# Patient Record
Sex: FEMALE | Race: WHITE
[De-identification: names, ages, dates, MRNs, and addresses within clinical notes are randomized per-mention and may not be internally consistent; named-entity substitution may affect disease eponyms.]

---

## 2019-03-23 ENCOUNTER — HOSPITAL ENCOUNTER (EMERGENCY)
Dept: HOSPITAL 10 - FTE | Age: 34
Discharge: HOME | End: 2019-03-23
Payer: COMMERCIAL

## 2019-03-23 ENCOUNTER — HOSPITAL ENCOUNTER (EMERGENCY)
Dept: HOSPITAL 91 - FTE | Age: 34
Discharge: HOME | End: 2019-03-23
Payer: COMMERCIAL

## 2019-03-23 VITALS
WEIGHT: 278.66 LBS | BODY MASS INDEX: 44.78 KG/M2 | HEIGHT: 66 IN | BODY MASS INDEX: 44.78 KG/M2 | HEIGHT: 66 IN | WEIGHT: 278.66 LBS

## 2019-03-23 VITALS — DIASTOLIC BLOOD PRESSURE: 86 MMHG | HEART RATE: 74 BPM | RESPIRATION RATE: 17 BRPM | SYSTOLIC BLOOD PRESSURE: 161 MMHG

## 2019-03-23 DIAGNOSIS — K80.20: Primary | ICD-10-CM

## 2019-03-23 LAB
ADD MAN DIFF?: NO
ADD UMIC: YES
ALANINE AMINOTRANSFERASE: 24 IU/L (ref 13–69)
ALBUMIN/GLOBULIN RATIO: 1.16
ALBUMIN: 4.2 G/DL (ref 3.3–4.9)
ALKALINE PHOSPHATASE: 140 IU/L (ref 42–121)
ANION GAP: 9 (ref 5–13)
ASPARTATE AMINO TRANSFERASE: 25 IU/L (ref 15–46)
BASOPHIL #: 0.1 10^3/UL (ref 0–0.1)
BASOPHILS %: 0.6 % (ref 0–2)
BILIRUBIN,DIRECT: 0 MG/DL (ref 0–0.2)
BILIRUBIN,TOTAL: 0.6 MG/DL (ref 0.2–1.3)
BLOOD UREA NITROGEN: 10 MG/DL (ref 7–20)
CALCIUM: 9.3 MG/DL (ref 8.4–10.2)
CARBON DIOXIDE: 28 MMOL/L (ref 21–31)
CHLORIDE: 106 MMOL/L (ref 97–110)
CREATININE: 0.53 MG/DL (ref 0.44–1)
EOSINOPHILS #: 0.2 10^3/UL (ref 0–0.5)
EOSINOPHILS %: 1.8 % (ref 0–7)
GLOBULIN: 3.6 G/DL (ref 1.3–3.2)
GLUCOSE: 110 MG/DL (ref 70–220)
HEMATOCRIT: 40.8 % (ref 37–47)
HEMOGLOBIN: 13.9 G/DL (ref 12–16)
IMMATURE GRANS #M: 0.03 10^3/UL (ref 0–0.03)
IMMATURE GRANS % (M): 0.3 % (ref 0–0.43)
LIPASE: 85 U/L (ref 23–300)
LYMPHOCYTES #: 2.1 10^3/UL (ref 0.8–2.9)
LYMPHOCYTES %: 20.7 % (ref 15–51)
MEAN CORPUSCULAR HEMOGLOBIN: 30.4 PG (ref 29–33)
MEAN CORPUSCULAR HGB CONC: 34.1 G/DL (ref 32–37)
MEAN CORPUSCULAR VOLUME: 89.3 FL (ref 82–101)
MEAN PLATELET VOLUME: 10.1 FL (ref 7.4–10.4)
MONOCYTE #: 0.6 10^3/UL (ref 0.3–0.9)
MONOCYTES %: 5.8 % (ref 0–11)
NEUTROPHIL #: 7.1 10^3/UL (ref 1.6–7.5)
NEUTROPHILS %: 70.8 % (ref 39–77)
NUCLEATED RED BLOOD CELLS #: 0 10^3/UL (ref 0–0)
NUCLEATED RED BLOOD CELLS%: 0 /100WBC (ref 0–0)
PLATELET COUNT: 315 10^3/UL (ref 140–415)
POTASSIUM: 4.1 MMOL/L (ref 3.5–5.1)
RED BLOOD COUNT: 4.57 10^6/UL (ref 4.2–5.4)
RED CELL DISTRIBUTION WIDTH: 12.5 % (ref 11.5–14.5)
SODIUM: 143 MMOL/L (ref 135–144)
TOTAL PROTEIN: 7.8 G/DL (ref 6.1–8.1)
UR ASCORBIC ACID: NEGATIVE MG/DL
UR BILIRUBIN (DIP): NEGATIVE MG/DL
UR BLOOD (DIP): (no result) MG/DL
UR CLARITY: CLEAR
UR COLOR: YELLOW
UR GLUCOSE (DIP): NEGATIVE MG/DL
UR KETONES (DIP): NEGATIVE MG/DL
UR LEUKOCYTE ESTERASE (DIP): NEGATIVE LEU/UL
UR MUCUS: (no result) /HPF
UR NITRITE (DIP): NEGATIVE MG/DL
UR PH (DIP): 5 (ref 5–9)
UR RBC: 7 /HPF (ref 0–5)
UR SPECIFIC GRAVITY (DIP): 1.02 (ref 1–1.03)
UR TOTAL PROTEIN (DIP): NEGATIVE MG/DL
UR UROBILINOGEN (DIP): NEGATIVE MG/DL
UR WBC: 2 /HPF (ref 0–5)
WHITE BLOOD COUNT: 10 10^3/UL (ref 4.8–10.8)

## 2019-03-23 PROCEDURE — 80053 COMPREHEN METABOLIC PANEL: CPT

## 2019-03-23 PROCEDURE — 76705 ECHO EXAM OF ABDOMEN: CPT

## 2019-03-23 PROCEDURE — 83690 ASSAY OF LIPASE: CPT

## 2019-03-23 PROCEDURE — 96361 HYDRATE IV INFUSION ADD-ON: CPT

## 2019-03-23 PROCEDURE — 99285 EMERGENCY DEPT VISIT HI MDM: CPT

## 2019-03-23 PROCEDURE — 81001 URINALYSIS AUTO W/SCOPE: CPT

## 2019-03-23 PROCEDURE — 85025 COMPLETE CBC W/AUTO DIFF WBC: CPT

## 2019-03-23 PROCEDURE — 96375 TX/PRO/DX INJ NEW DRUG ADDON: CPT

## 2019-03-23 PROCEDURE — 84703 CHORIONIC GONADOTROPIN ASSAY: CPT

## 2019-03-23 PROCEDURE — 36415 COLL VENOUS BLD VENIPUNCTURE: CPT

## 2019-03-23 PROCEDURE — 96374 THER/PROPH/DIAG INJ IV PUSH: CPT

## 2019-03-23 RX ADMIN — FAMOTIDINE 1 MG: 10 INJECTION, SOLUTION INTRAVENOUS at 07:39

## 2019-03-23 RX ADMIN — MORPHINE SULFATE 1 MG: 2 INJECTION, SOLUTION INTRAMUSCULAR; INTRAVENOUS at 07:39

## 2019-03-23 RX ADMIN — THIAMINE HYDROCHLORIDE 1 MLS/HR: 100 INJECTION, SOLUTION INTRAMUSCULAR; INTRAVENOUS at 07:39

## 2019-03-23 RX ADMIN — ONDANSETRON HYDROCHLORIDE 1 MG: 2 INJECTION, SOLUTION INTRAMUSCULAR; INTRAVENOUS at 07:39

## 2019-03-23 NOTE — ERD
ER Documentation


Chief Complaint


Chief Complaint





upper abdominal pain radiating to back this morning, + vomiting





HPI


33-year-old female, with history of cholelithiasis the pain is dull,, presents 


the emergency department, complaining of acute onset of right upper quadrant 


abdominal pain, radiating to the back.  Constant, 4/10.  No fever, no chills.  


The patient also reports 3 episodes of postprandial emesis.





ROS


All systems reviewed and are negative except as per history of present illness.





Medications


Home Meds


Active Scripts


Acetaminophen* (Tylenol*) 325 Mg Tablet, 2 TAB PO Q8 PRN for PAIN AND OR 


ELEVATED TEMP, #20 TAB


   Prov:ISI BUTLER MD         3/23/19


Ranitidine Hcl* (Zantac*) 150 Mg Tablet, 150 MG PO BID PRN for EPIGASTRIC PAIN, 


#10 TAB


   Prov:ISI BUTLER MD         3/23/19


Acetaminophen* (Tylophen*) 500 Mg Capsule, 1 CAP PO Q6H PRN for PAIN AND OR 


ELEVATED TEMP, #20 CAP


   Prov:ISI BUTLER MD         18


Ranitidine Hcl* (Ranitidine Hcl*) 150 Mg Tablet, 150 MG PO Q12, #60 TAB


   Prov:ISI BUTLER MD         18


Ondansetron (Ondansetron Odt) 4 Mg Tab.rapdis, 4 MG PO Q6H PRN for NAUSEA AND/OR


VOMITING, #20 TAB


   Prov:RESHMA POLO PA-C         16


Hydrocodone/Acetaminophen (Norco 5-325 Tablet) 1 Each Tablet, 1 TAB PO Q6H PRN f


or PAIN, #20 TAB


   Prov:RESHMA POLO PA-C         16


Reported Medications


Folic Acid* (Folic Acid*) 1 Mg Tablet, 1 MG PO DAILY, TAB


   14


Multivit/Min/Fol Ac/Iron/Pren* (Prenatal S*) 1 Tab Tab, 1 TAB PO DAILY, TAB


   14


Hydrochlorothiazide* (Hydrochlorothiazide*) 25 Mg Tab, 25 MG PO DAILY, TAB


   14





Allergies


Allergies:  


Coded Allergies:  


     No Known Allergies (Verified  Allergy, Mild, 3/23/19)





PMhx/Soc


History of Surgery:  No


Anesthesia Reaction:  No


Hx Neurological Disorder:  No


Hx Respiratory Disorders:  No


Hx Cardiac Disorders:  No


Hx Psychiatric Problems:  No


Hx Miscellaneous Medical Probl:  No


Hx Alcohol Use:  No


Hx Substance Use:  No


Hx Tobacco Use:  No





FmHx


Family History:  diabetes; 


   No coronary disease





Physical Exam


Vitals





Vital Signs


  Date      Temp  Pulse  Resp  B/P (MAP)   Pulse Ox  O2          O2 Flow    FiO2


Time                                                 Delivery    Rate


   3/23/19  97.9     86    18      143/87        99


     06:43                          (105)





Physical Exam


Const:   No acute distress


Head:   Atraumatic 


Eyes:    Normal Conjunctiva


ENT:    Normal External Ears, Nose and Mouth.


Neck:               Full range of motion. No meningismus.


Resp:   Clear to auscultation bilaterally


Cardio:   Regular rate and rhythm, no murmurs


Abd:    Soft, tender in the right upper quadrant area,?  Blanchard sign.  Normal 


bowel sounds


Skin:   No petechiae or rashes


Back:   No midline or flank tenderness


Ext:    No cyanosis, or edema


Neur:   Awake and alert


Psych:    Normal Mood and Affect


Result Diagram:  


3/23/19 0729                                                                    


           3/23/19 0729





Results 24 hrs





Laboratory Tests


              Test
                                  3/23/19
07:29


              White Blood Count                      10.0 10^3/ul


              Red Blood Count                        4.57 10^6/ul


              Hemoglobin                                13.9 g/dl


              Hematocrit                                   40.8 %


              Mean Corpuscular Volume                     89.3 fl


              Mean Corpuscular Hemoglobin                 30.4 pg


              Mean Corpuscular Hemoglobin
Concent      34.1 g/dl 



              Red Cell Distribution Width                  12.5 %


              Platelet Count                          315 10^3/UL


              Mean Platelet Volume                        10.1 fl


              Immature Granulocytes %                     0.300 %


              Neutrophils %                                70.8 %


              Lymphocytes %                                20.7 %


              Monocytes %                                   5.8 %


              Eosinophils %                                 1.8 %


              Basophils %                                   0.6 %


              Nucleated Red Blood Cells %             0.0 /100WBC


              Immature Granulocytes #               0.030 10^3/ul


              Neutrophils #                           7.1 10^3/ul


              Lymphocytes #                           2.1 10^3/ul


              Monocytes #                             0.6 10^3/ul


              Eosinophils #                           0.2 10^3/ul


              Basophils #                             0.1 10^3/ul


              Nucleated Red Blood Cells #             0.0 10^3/ul


              Urine Color                          YELLOW


              Urine Clarity                        CLEAR


              Urine pH                                        5.0


              Urine Specific Gravity                        1.021


              Urine Ketones                        NEGATIVE mg/dL


              Urine Nitrite                        NEGATIVE mg/dL


              Urine Bilirubin                      NEGATIVE mg/dL


              Urine Urobilinogen                   NEGATIVE mg/dL


              Urine Leukocyte Esterase
            NEGATIVE
John/ul


              Urine Microscopic RBC                        7 /HPF


              Urine Microscopic WBC                        2 /HPF


              Urine Mucus                          FEW /HPF


              Urine Hemoglobin                           2+ mg/dL


              Urine Glucose                        NEGATIVE mg/dL


              Urine Total Protein                  NEGATIVE mg/dl


              Sodium Level                             143 mmol/L


              Potassium Level                          4.1 mmol/L


              Chloride Level                           106 mmol/L


              Carbon Dioxide Level                      28 mmol/L


              Anion Gap                                         9


              Blood Urea Nitrogen                        10 mg/dl


              Creatinine                               0.53 mg/dl


              Est Glomerular Filtrat Rate
mL/min   > 60 mL/min 



              Glucose Level                             110 mg/dl


              Calcium Level                             9.3 mg/dl


              Total Bilirubin                           0.6 mg/dl


              Direct Bilirubin                         0.00 mg/dl


              Indirect Bilirubin                        0.6 mg/dl


              Aspartate Amino Transf
(AST/SGOT)          25 IU/L 



              Alanine Aminotransferase
(ALT/SGPT)        24 IU/L 



              Alkaline Phosphatase                       140 IU/L


              Total Protein                              7.8 g/dl


              Albumin                                    4.2 g/dl


              Globulin                                  3.60 g/dl


              Albumin/Globulin Ratio                         1.16


              Lipase                                       85 U/L


              Serum HCG, Qualitative               NEGATIVE





Current Medications


 Medications
   Dose
          Sig/Cody
       Start Time
   Status  Last


 (Trade)       Ordered        Route
 PRN     Stop Time              Admin
Dose


                              Reason                                Admin


 Sodium         1,000 ml @ 
   Q1H STAT
      3/23/19       DC           3/23/19


Chloride       1,000 mls/hr   IV
            07:02
                       07:39



                                             3/23/19 08:01


 Morphine       1 mg           ONCE  STAT
    3/23/19       DC           3/23/19


Sulfate
                      IV
            07:02
                       07:39



(morphine)                                   3/23/19 07:07


 Ondansetron    4 mg           ONCE  STAT
    3/23/19       DC           3/23/19


HCl
  (Zofran                 IV
            07:02
                       07:39



Inj)                                         3/23/19 07:07


 Famotidine
    20 mg          ONCE  STAT
    3/23/19       DC           3/23/19


(Pepcid Iv)                   IV
            07:02
                       07:39



                                             3/23/19 07:07





Patient: STACIA DUNLAP   : 1985   Age: 33  Sex: F                    


   


MR #:    [id number]   Acct #:   [id number]    DOS: 19


Ordering MD: ISI BUTLER MD   Location:  [facility]   Room/Bed:            


                               





PROCEDURE:   ULTRASOUND LIMITED ABDOMEN  


 


CLINICAL INDICATION:   33-year-old female with abdominal pain. 


 


TECHNIQUE:   Multiple sonographic of the right upper quadrant of the abdomen 


were obtained. The images were reviewed on a PACS workstation. 


 


COMPARISON:   Right upper quadrant ultrasound 2018. 


 


FINDINGS:


 


The pancreas is partially visualized and is otherwise without abnormal 


echogenicity.


 


The liver displays diffuse increased echogenicity. The liver measures 18.0 cm in


length.  No evidence of intrahepatic biliary ductal dilatation is seen. The 


portal and hepatic veins are unremarkable. 


 


The gallbladder contains a shadowing stone measuring approximately 2.3 cm. The 


gallbladder wall thickness is within normal limits measuring 2.3 mm. No 


pericholecystic fluid is seen. The common bile duct measures 4.2 mm and is not 


dilated. 


 


The right kidney displays normal echogenicity. The right kidney measures 13.2 cm


in maximal length. No caliectasis or hydronephrosis is seen.


 


No free fluid is seen.


 


IMPRESSION:


 


1.  Hepatic steatosis.


2.  Cholelithiasis.





Procedures/MDM


Vital signs stable. Differential diagnosis include but not limited to: UTI, 


colitis, gastroenteritis, kidney stones, irritable bowel syndrome, inflammatory 


bowel syndrome, malabsorption syndrome, cholelithiasis, food intolerance, 


medication side effect, pancreatitis, diverticulitis, bowel obstruction.  


Physical examination and clinical presentation consistent most likely with 


biliary colic, no evidence of acute cholecystitis.


During the ED course the patient remained stable, no new complaints. The patient


received treatment with IV fluids and IV medications presenting overall improvem


ent of the symptoms.


Results and clinical impression discussed with the patient who agrees with colt hernandez. The patient is stable to be treated outpatient and will be discharged 


home; some side effects of prescribed medications (headache, rash, nausea, 


vomiting, diarrhea, drowsiness, habituation, bleeding, hypertension, 


interactions with other medications) were reviewed.





Follow up with the primary care provider in the next 48h is recommended.  If 


symptoms persist, worsen or new symptoms develop, then patient should return to 


the ED immediately.





Instructions explained and given directly by me to the patient with 


acknowledgment and demonstrated understanding.





Disclaimer: Inadvertent spelling and grammatical errors are likely due to 


EHR/dictation software use and do not reflect on the overall quality of patient 


care. Also, please note that the electronic time recorded on this note does not 


necessarily reflect the actual time of the patient encounter.





Departure


Diagnosis:  


   Primary Impression:  


   Biliary colic


Condition:  Stable





Additional Instructions:  


Thank you very much for allowing us to participate in your care. 


Your health and safety is our top priority at [facility].





Call your primary care doctor TOMORROW for an appointment during the next 2-4 


days and bring all the information and medications prescribed. 





Have prescriptions filled and follow precisely the directions on the label. 





If the symptoms get worse and your provider is unavailable, return to the 


Emergency Department immediately.











ISI BUTLER MD      Mar 23, 2019 07:02

## 2019-06-14 ENCOUNTER — HOSPITAL ENCOUNTER (EMERGENCY)
Dept: HOSPITAL 10 - FTE | Age: 34
LOS: 1 days | Discharge: HOME | End: 2019-06-15
Payer: COMMERCIAL

## 2019-06-14 ENCOUNTER — HOSPITAL ENCOUNTER (EMERGENCY)
Dept: HOSPITAL 91 - FTE | Age: 34
LOS: 1 days | Discharge: HOME | End: 2019-06-15
Payer: COMMERCIAL

## 2019-06-14 VITALS
BODY MASS INDEX: 45.53 KG/M2 | WEIGHT: 283.29 LBS | HEIGHT: 66 IN | HEIGHT: 66 IN | WEIGHT: 283.29 LBS | BODY MASS INDEX: 45.53 KG/M2

## 2019-06-14 VITALS — HEART RATE: 81 BPM | SYSTOLIC BLOOD PRESSURE: 158 MMHG | RESPIRATION RATE: 18 BRPM | DIASTOLIC BLOOD PRESSURE: 83 MMHG

## 2019-06-14 DIAGNOSIS — O03.9: Primary | ICD-10-CM

## 2019-06-14 DIAGNOSIS — Z3A.08: ICD-10-CM

## 2019-06-14 DIAGNOSIS — R10.2: ICD-10-CM

## 2019-06-14 PROCEDURE — 36415 COLL VENOUS BLD VENIPUNCTURE: CPT

## 2019-06-14 PROCEDURE — 81001 URINALYSIS AUTO W/SCOPE: CPT

## 2019-06-14 PROCEDURE — 84702 CHORIONIC GONADOTROPIN TEST: CPT

## 2019-06-14 PROCEDURE — 86900 BLOOD TYPING SEROLOGIC ABO: CPT

## 2019-06-14 PROCEDURE — 76801 OB US < 14 WKS SINGLE FETUS: CPT

## 2019-06-14 PROCEDURE — 85025 COMPLETE CBC W/AUTO DIFF WBC: CPT

## 2019-06-14 PROCEDURE — 86901 BLOOD TYPING SEROLOGIC RH(D): CPT

## 2019-06-14 PROCEDURE — 99284 EMERGENCY DEPT VISIT MOD MDM: CPT

## 2019-06-14 RX ADMIN — ACETAMINOPHEN 1 MG: 500 TABLET, FILM COATED ORAL at 23:58

## 2019-06-15 LAB
ADD MAN DIFF?: NO
ADD UMIC: YES
BASOPHIL #: 0.1 10^3/UL (ref 0–0.1)
BASOPHILS %: 0.5 % (ref 0–2)
EOSINOPHILS #: 0.2 10^3/UL (ref 0–0.5)
EOSINOPHILS %: 1.8 % (ref 0–7)
HEMATOCRIT: 38.1 % (ref 37–47)
HEMOGLOBIN: 12.9 G/DL (ref 12–16)
IMMATURE GRANS #M: 0.04 10^3/UL (ref 0–0.03)
IMMATURE GRANS % (M): 0.3 % (ref 0–0.43)
LYMPHOCYTES #: 2.9 10^3/UL (ref 0.8–2.9)
LYMPHOCYTES %: 24.1 % (ref 15–51)
MEAN CORPUSCULAR HEMOGLOBIN: 30.4 PG (ref 29–33)
MEAN CORPUSCULAR HGB CONC: 33.9 G/DL (ref 32–37)
MEAN CORPUSCULAR VOLUME: 89.6 FL (ref 82–101)
MEAN PLATELET VOLUME: 10.7 FL (ref 7.4–10.4)
MONOCYTE #: 0.7 10^3/UL (ref 0.3–0.9)
MONOCYTES %: 6.1 % (ref 0–11)
NEUTROPHIL #: 8.2 10^3/UL (ref 1.6–7.5)
NEUTROPHILS %: 67.2 % (ref 39–77)
NUCLEATED RED BLOOD CELLS #: 0 10^3/UL (ref 0–0)
NUCLEATED RED BLOOD CELLS%: 0 /100WBC (ref 0–0)
PLATELET COUNT: 301 10^3/UL (ref 140–415)
RED BLOOD COUNT: 4.25 10^6/UL (ref 4.2–5.4)
RED CELL DISTRIBUTION WIDTH: 13 % (ref 11.5–14.5)
UR ASCORBIC ACID: NEGATIVE MG/DL
UR BACTERIA: (no result) /HPF
UR BILIRUBIN (DIP): NEGATIVE MG/DL
UR BLOOD (DIP): (no result) MG/DL
UR CLARITY: (no result)
UR COLOR: YELLOW
UR GLUCOSE (DIP): NEGATIVE MG/DL
UR KETONES (DIP): NEGATIVE MG/DL
UR LEUKOCYTE ESTERASE (DIP): NEGATIVE LEU/UL
UR NITRITE (DIP): NEGATIVE MG/DL
UR PH (DIP): 6 (ref 5–9)
UR RBC: 2 /HPF (ref 0–5)
UR SPECIFIC GRAVITY (DIP): 1.02 (ref 1–1.03)
UR TOTAL PROTEIN (DIP): NEGATIVE MG/DL
UR UROBILINOGEN (DIP): (no result) MG/DL
UR WBC: 0 /HPF (ref 0–5)
WHITE BLOOD COUNT: 12.2 10^3/UL (ref 4.8–10.8)

## 2019-06-15 NOTE — ERD
ER Documentation


Chief Complaint


Chief Complaint





vaginal bleeding/cramping x 1 hour, states 8 weeks pregnant





HPI


History of Present Illness: 33-year-old female who denies a past medical history


coming in today with complaint of vaginal bleeding and lower abdominal cramping 


that is been present for 1 hour prior to arrival.  Patient is G4, P2.  Reports 


she is approximately 8 weeks pregnant.  Patient reports that bleeding is consist


ent with a menstrual cycle with very small clotting.





At home pharmacological/nonpharmacological treatment for symptoms: Denies





Denies social concerns; Denies recent foreign travel





ROS


All systems reviewed and are negative except as per history of present illness.





Medications


Home Meds


Active Scripts


Acetaminophen* (Tylenol*) 325 Mg Tablet, 2 TAB PO Q8 PRN for PAIN AND OR 


ELEVATED TEMP, #20 TAB


   Prov:ISI BUTLER MD         3/23/19


Ranitidine Hcl* (Zantac*) 150 Mg Tablet, 150 MG PO BID PRN for EPIGASTRIC PAIN, 


#10 TAB


   Prov:ISI BUTLER MD         3/23/19


Acetaminophen* (Tylophen*) 500 Mg Capsule, 1 CAP PO Q6H PRN for PAIN AND OR 


ELEVATED TEMP, #20 CAP


   Prov:ISI BUTLER MD         18


Ranitidine Hcl* (Ranitidine Hcl*) 150 Mg Tablet, 150 MG PO Q12, #60 TAB


   Prov:ISI BUTLER MD         18


Ondansetron (Ondansetron Odt) 4 Mg Tab.rapdis, 4 MG PO Q6H PRN for NAUSEA AND/OR


VOMITING, #20 TAB


   Prov:RESHMA POLO PA-C         16


Hydrocodone/Acetaminophen (Norco 5-325 Tablet) 1 Each Tablet, 1 TAB PO Q6H PRN 


for PAIN, #20 TAB


   Prov:RESHMA POLO PA-C         16


Reported Medications


Folic Acid* (Folic Acid*) 1 Mg Tablet, 1 MG PO DAILY, TAB


   14


Multivit/Min/Fol Ac/Iron/Pren* (Prenatal S*) 1 Tab Tab, 1 TAB PO DAILY, TAB


   14


Hydrochlorothiazide* (Hydrochlorothiazide*) 25 Mg Tab, 25 MG PO DAILY, TAB


   14





Allergies


Allergies:  


Coded Allergies:  


     No Known Allergies (Verified  Allergy, Mild, 19)





PMhx/Soc


History of Surgery:  Yes (c section x 2)


Anesthesia Reaction:  No


Hx Neurological Disorder:  No


Hx Respiratory Disorders:  No


Hx Cardiac Disorders:  No


Hx Psychiatric Problems:  No


Hx Miscellaneous Medical Probl:  No


Hx Alcohol Use:  No


Hx Substance Use:  No


Hx Tobacco Use:  No


Smoking Status:  Never smoker





FmHx


Family History:  diabetes, coronary disease





Physical Exam


Vitals





Vital Signs


  Date      Temp  Pulse  Resp  B/P (MAP)   Pulse Ox  O2          O2 Flow    FiO2


Time                                                 Delivery    Rate


   19  99.3     81    18      158/83       100


     22:01                          (108)





Physical Exam


Const:   No acute distress, afebrile


Head:   Atraumatic 


Eyes:    Normal Conjunctiva


ENT:    Normal External Ears, Nose and Mouth.


Neck:               Full range of motion. No meningismus.


Resp:   Clear to auscultation bilaterally


Cardio:   Regular rate and rhythm, no murmurs


Abd:    Soft, non tender, non distended.  No guarding, no masses, no rigidity.  


Obese.


Skin:   No petechiae or rashes


Back:   No midline or flank tenderness


Ext:    No cyanosis, or edema


Neur:   Awake and alert x3, speaking in clear sentences, no focal deficits or 


facial asymmetry


Psych:    Normal Mood and Affect


Result Diagram:  


19 2351





Results 24 hrs





Laboratory Tests


              Test
                                  19
23:51


              White Blood Count                      12.2 10^3/ul


              Red Blood Count                        4.25 10^6/ul


              Hemoglobin                                12.9 g/dl


              Hematocrit                                   38.1 %


              Mean Corpuscular Volume                     89.6 fl


              Mean Corpuscular Hemoglobin                 30.4 pg


              Mean Corpuscular Hemoglobin
Concent      33.9 g/dl 



              Red Cell Distribution Width                  13.0 %


              Platelet Count                          301 10^3/UL


              Mean Platelet Volume                        10.7 fl


              Immature Granulocytes %                     0.300 %


              Neutrophils %                                67.2 %


              Lymphocytes %                                24.1 %


              Monocytes %                                   6.1 %


              Eosinophils %                                 1.8 %


              Basophils %                                   0.5 %


              Nucleated Red Blood Cells %             0.0 /100WBC


              Immature Granulocytes #               0.040 10^3/ul


              Neutrophils #                           8.2 10^3/ul


              Lymphocytes #                           2.9 10^3/ul


              Monocytes #                             0.7 10^3/ul


              Eosinophils #                           0.2 10^3/ul


              Basophils #                             0.1 10^3/ul


              Nucleated Red Blood Cells #             0.0 10^3/ul


              Urine Color                          YELLOW


              Urine Clarity
                       SLIGHTLY
CLOUDY


              Urine pH                                        6.0


              Urine Specific Gravity                        1.016


              Urine Ketones                        NEGATIVE mg/dL


              Urine Nitrite                        NEGATIVE mg/dL


              Urine Bilirubin                      NEGATIVE mg/dL


              Urine Urobilinogen                         1+ mg/dL


              Urine Leukocyte Esterase
            NEGATIVE
John/ul


              Urine Microscopic RBC                        2 /HPF


              Urine Microscopic WBC                        0 /HPF


              Urine Bacteria                       FEW /HPF


              Urine Hemoglobin                           2+ mg/dL


              Urine Glucose                        NEGATIVE mg/dL


              Urine Total Protein                  NEGATIVE mg/dl


              Beta HCG, Quantitative
              408153.0
mIU/ml





Current Medications


 Medications
   Dose
          Sig/Cody
       Start Time
   Status  Last


 (Trade)       Ordered        Route
 PRN     Stop Time              Admin
Dose


                              Reason                                Admin


                1,000 mg       ONCE  STAT
    19       DC           19


Acetaminophen                 PO
            23:44
                       23:58




  (Tylenol                                  19 23:46


Tab)








Procedures/MDM


ED course includes a thorough examination and history. 


Medications Tylenol 


imaging: OB pelvic ultrasound


Labs: CBC, beta quantitative, urinalysis, Rh





Low suspicion for life-threatening medical emergency.  Low suspicion for gy


necologic/obstetrical emergency that requires hospitalization or immediate 


surgical intervention.  Low suspicion for hemorrhage.  Low suspicion for acute 


abdominal emergency.  Low suspicion for infectious process that requires 


antibiotics.





Otherwise healthy patient presenting with constellation of symptoms likely 


representing vaginal bleeding during pregnancy/threatened  as 


characterized by history, physical exam findings, lab findings, ultrasound 


findings.


CBC:      no e/o of systemic infection or severe anemia.  Beta quantitative is 


102k.  Urinalysis negative for urinary tract infection.  Rh is O+, no RhoGam 


indicated





Patient reassessment 0205: No signs of hemorrhage at this time, bleeding has not


worsened.  Patient hemodynamically stable.  No respiratory distress, otherwise 


relatively well appearing and nontoxic.  Disposition given.  Patient educated on


diagnoses, prescriptions, follow-up care, return precautions.  Strict return 


precautions given for worsening condition; questions answered discharge.  


Patient verbalizes understanding of discharge instructions as well as plan of 


care, return precautions, follow-up.





Disposition for discharge with followup in 2 days with PCP/clinic.





Departure


Diagnosis:  


   Primary Impression:  


   Threatened miscarriage


   Additional Impression:  


   Vaginal bleeding in pregnant patient at less than 20 weeks ges...


Condition:  Stable


Patient Instructions:  Bleeding During Early Pregnancy, Possible Miscarriage 


(Threatened )


Referrals:  


COMMUNITY CLINICS


YOU HAVE RECEIVED A MEDICAL SCREENING EXAM AND THE RESULTS INDICATE THAT YOU DO 


NOT HAVE A CONDITION THAT REQUIRES URGENT TREATMENT IN THE EMERGENCY DEPARTMENT.





FURTHER EVALUATION AND TREATMENT OF YOUR CONDITION CAN WAIT UNTIL YOU ARE SEEN 


IN YOUR DOCTORS OFFICE WITHIN THE NEXT 1-2 DAYS. IT IS YOUR RESPONSIBILITY TO 


MAKE AN APPOINTMENT FOR FOLOW-UP CARE.





IF YOU HAVE A PRIMARY DOCTOR


--you should call your primary doctor and schedule an appointment





IF YOU DO NOT HAVE A PRIMARY DOCTOR YOU CAN CALL OUR PHYSICIAN REFERRAL HOTLINE 


AT


 [phone] 





IF YOU CAN NOT AFFORD TO SEE A PHYSICIAN YOU CAN CHOSE FROM THE FOLLOWING 


[facility] [phone] [address] [facility]. [facility] [phone] [address] [facility]. [facility] [phone] [address] [facility] [phone] [address] [facility]. [facility] [phone] [address] [facility]. [facility] [phone]


[address] [facility]. [facility]


YOU HAVE RECEIVED A MEDICAL SCREENING EXAM AND THE RESULTS INDICATE THAT YOU DO 


NOT HAVE A CONDITION THAT REQUIRES URGENT TREATMENT IN THE EMERGENCY DEPARTMENT.





FURTHER EVALUATION AND TREATMENT OF YOUR CONDITION CAN WAIT UNTIL YOU ARE SEEN 


IN YOUR DOCTORS OFFICE WITHIN THE NEXT 1-2 DAYS. IT IS YOUR RESPONSIBILITY TO 


MAKE AN APPOINTMENT FOR FOLOW-UP CARE.





IF YOU HAVE A PRIMARY DOCTOR


--you should call your primary doctor and schedule and appointment





IF YOU DO NOT HAVE A PRIMARY DOCTOR YOU CAN CALL OUR PHYSICIAN REFERRAL HOTLINE 


AT [phone].





IF YOU CAN NOT AFFORD TO SEE A PHYSICIAN YOU CAN CHOSE FROM THE FOLLOWING [facility]:





[facility]


[address] [place] [address]





[facility]


[address] [place] [address]





[facility]


[address] [place] [address]





OB/GYN REFERRAL LIST


CHECO KINGSLEY MD


[address] [facility] [address] 


[address] [place] [address] [phone] OFFICE FAX [phone]





TIM ZIEGLER


[address] [place] [address] [phone]





DR. ARORA TOMI


[address] [facility], [place] [address]


[phone]





[facility], [facility]


[address] [facility], [address], [place] [address]


[phone]





KARLI SCHWARTZRO


[address] [facility], [place] [address]


[phone]





[facility]


[address] [facility] [place] [address]


[phone] [address] [facility], [facility] [address]


[phone]  [phone]





DR WARREN CAROLINE


[address], [place] [address]


[phone]





DR CABEZAS, SHAHBAZ


[address] [facility], [place] [address]


[phone]





DR ABEL, [facility]


[address] [place] [address]


[phone]





Additional Instructions:  


Thank you very much for allowing us to participate in your care. 


Your health and safety is our top priority at [facility].  It 


is important to read all discharge instructions and education provided in your 


discharge packet.





*There is a heartbeat on the ultrasound at this time.  Is very important to 


return to the emergency department if you develop worsening bleeding.  Signs of 


life-threatening hemorrhage include soaking 1 pad per hour.*





Call your primary care doctor/OB/GYN TOMORROW for an appointment during the next


2-4 days and bring all the information.





If the symptoms get worse and your provider is unavailable, return to the 


Emergency Department immediately.











LETY LEBRON NP            [place] 15, 2019 02:18

## 2019-07-18 ENCOUNTER — HOSPITAL ENCOUNTER (EMERGENCY)
Dept: HOSPITAL 10 - FTE | Age: 34
Discharge: HOME | End: 2019-07-18
Payer: COMMERCIAL

## 2019-07-18 ENCOUNTER — HOSPITAL ENCOUNTER (EMERGENCY)
Dept: HOSPITAL 91 - FTE | Age: 34
Discharge: HOME | End: 2019-07-18
Payer: COMMERCIAL

## 2019-07-18 VITALS
BODY MASS INDEX: 44.68 KG/M2 | HEIGHT: 66 IN | WEIGHT: 278 LBS | HEIGHT: 66 IN | WEIGHT: 278 LBS | BODY MASS INDEX: 44.68 KG/M2

## 2019-07-18 VITALS — RESPIRATION RATE: 18 BRPM | DIASTOLIC BLOOD PRESSURE: 88 MMHG | HEART RATE: 72 BPM | SYSTOLIC BLOOD PRESSURE: 138 MMHG

## 2019-07-18 DIAGNOSIS — O20.9: Primary | ICD-10-CM

## 2019-07-18 DIAGNOSIS — Z3A.14: ICD-10-CM

## 2019-07-18 DIAGNOSIS — R10.2: ICD-10-CM

## 2019-07-18 LAB
ADD MAN DIFF?: NO
ADD UMIC: YES
ALANINE AMINOTRANSFERASE: 19 IU/L (ref 13–69)
ALBUMIN/GLOBULIN RATIO: 1.08
ALBUMIN: 3.8 G/DL (ref 3.3–4.9)
ALKALINE PHOSPHATASE: 82 IU/L (ref 42–121)
ANION GAP: 7 (ref 5–13)
ASPARTATE AMINO TRANSFERASE: 15 IU/L (ref 15–46)
BASOPHIL #: 0 10^3/UL (ref 0–0.1)
BASOPHILS %: 0.3 % (ref 0–2)
BILIRUBIN,DIRECT: 0 MG/DL (ref 0–0.2)
BILIRUBIN,TOTAL: 0.5 MG/DL (ref 0.2–1.3)
BLOOD UREA NITROGEN: 9 MG/DL (ref 7–20)
CALCIUM: 9.6 MG/DL (ref 8.4–10.2)
CARBON DIOXIDE: 25 MMOL/L (ref 21–31)
CHLORIDE: 107 MMOL/L (ref 97–110)
CREATININE: 0.51 MG/DL (ref 0.44–1)
EOSINOPHILS #: 0.2 10^3/UL (ref 0–0.5)
EOSINOPHILS %: 1.4 % (ref 0–7)
GLOBULIN: 3.5 G/DL (ref 1.3–3.2)
GLUCOSE: 125 MG/DL (ref 70–220)
HEMATOCRIT: 38.3 % (ref 37–47)
HEMOGLOBIN: 12.9 G/DL (ref 12–16)
IMMATURE GRANS #M: 0.04 10^3/UL (ref 0–0.03)
IMMATURE GRANS % (M): 0.4 % (ref 0–0.43)
LYMPHOCYTES #: 2.1 10^3/UL (ref 0.8–2.9)
LYMPHOCYTES %: 19.8 % (ref 15–51)
MEAN CORPUSCULAR HEMOGLOBIN: 30.3 PG (ref 29–33)
MEAN CORPUSCULAR HGB CONC: 33.7 G/DL (ref 32–37)
MEAN CORPUSCULAR VOLUME: 89.9 FL (ref 82–101)
MEAN PLATELET VOLUME: 10.5 FL (ref 7.4–10.4)
MONOCYTE #: 0.6 10^3/UL (ref 0.3–0.9)
MONOCYTES %: 5.4 % (ref 0–11)
NEUTROPHIL #: 7.8 10^3/UL (ref 1.6–7.5)
NEUTROPHILS %: 72.7 % (ref 39–77)
NUCLEATED RED BLOOD CELLS #: 0 10^3/UL (ref 0–0)
NUCLEATED RED BLOOD CELLS%: 0 /100WBC (ref 0–0)
PLATELET COUNT: 245 10^3/UL (ref 140–415)
POTASSIUM: 4.5 MMOL/L (ref 3.5–5.1)
RED BLOOD COUNT: 4.26 10^6/UL (ref 4.2–5.4)
RED CELL DISTRIBUTION WIDTH: 13.1 % (ref 11.5–14.5)
SODIUM: 139 MMOL/L (ref 135–144)
TOTAL PROTEIN: 7.3 G/DL (ref 6.1–8.1)
UR ASCORBIC ACID: NEGATIVE MG/DL
UR BILIRUBIN (DIP): NEGATIVE MG/DL
UR BLOOD (DIP): (no result) MG/DL
UR CLARITY: CLEAR
UR COLOR: YELLOW
UR GLUCOSE (DIP): NEGATIVE MG/DL
UR KETONES (DIP): NEGATIVE MG/DL
UR LEUKOCYTE ESTERASE (DIP): NEGATIVE LEU/UL
UR MUCUS: (no result) /HPF
UR NITRITE (DIP): NEGATIVE MG/DL
UR PH (DIP): 6 (ref 5–9)
UR RBC: 46 /HPF (ref 0–5)
UR SPECIFIC GRAVITY (DIP): 1.01 (ref 1–1.03)
UR TOTAL PROTEIN (DIP): NEGATIVE MG/DL
UR UROBILINOGEN (DIP): NEGATIVE MG/DL
UR WBC: 2 /HPF (ref 0–5)
WHITE BLOOD COUNT: 10.7 10^3/UL (ref 4.8–10.8)

## 2019-07-18 PROCEDURE — 86901 BLOOD TYPING SEROLOGIC RH(D): CPT

## 2019-07-18 PROCEDURE — 76805 OB US >/= 14 WKS SNGL FETUS: CPT

## 2019-07-18 PROCEDURE — 99284 EMERGENCY DEPT VISIT MOD MDM: CPT

## 2019-07-18 PROCEDURE — 36415 COLL VENOUS BLD VENIPUNCTURE: CPT

## 2019-07-18 PROCEDURE — 86900 BLOOD TYPING SEROLOGIC ABO: CPT

## 2019-07-18 PROCEDURE — 81001 URINALYSIS AUTO W/SCOPE: CPT

## 2019-07-18 PROCEDURE — 80053 COMPREHEN METABOLIC PANEL: CPT

## 2019-07-18 PROCEDURE — 84702 CHORIONIC GONADOTROPIN TEST: CPT

## 2019-07-18 PROCEDURE — 85025 COMPLETE CBC W/AUTO DIFF WBC: CPT

## 2019-07-18 RX ADMIN — ACETAMINOPHEN 1 MG: 325 TABLET, FILM COATED ORAL at 04:10

## 2019-07-18 NOTE — ERD
ER Documentation


Chief Complaint


Chief Complaint





vaginal bleeding x 30 minutes, states 13 weeks pregnant





HPI


33-year-old female who is reportedly 13 weeks pregnant, G4, , last 


menstrual cycle 4/15/2019 presents with complaints of sudden onset heavy vaginal


bleeding with clotting just prior to arrival.  She also reports mild vaginal 


discomfort.  She took no medication for relief of symptoms.  She did have a 


normal ultrasound during this pregnancy on 7/10/2019.  This ultrasound did show 


positive fetal heart tones.  Patient denies any other symptoms at this time.





ROS


All systems reviewed and are negative except as per history of present illness.





Medications


Home Meds


Active Scripts


Acetaminophen* (Tylenol*) 325 Mg Tablet, 2 TAB PO Q8 PRN for PAIN AND OR 


ELEVATED TEMP, #20 TAB


   Prov:ISI BUTLER MD         3/23/19


Ranitidine Hcl* (Zantac*) 150 Mg Tablet, 150 MG PO BID PRN for EPIGASTRIC PAIN, 


#10 TAB


   Prov:ISI BUTLER MD         3/23/19


Acetaminophen* (Tylophen*) 500 Mg Capsule, 1 CAP PO Q6H PRN for PAIN AND OR 


ELEVATED TEMP, #20 CAP


   Prov:ISI BUTLER MD         18


Ranitidine Hcl* (Ranitidine Hcl*) 150 Mg Tablet, 150 MG PO Q12, #60 TAB


   Prov:ISI BUTLER MD         18


Ondansetron (Ondansetron Odt) 4 Mg Tab.rapdis, 4 MG PO Q6H PRN for NAUSEA AND/OR


VOMITING, #20 TAB


   Prov:RESHMA POLO PA-C         16


Hydrocodone/Acetaminophen (Norco 5-325 Tablet) 1 Each Tablet, 1 TAB PO Q6H PRN 


for PAIN, #20 TAB


   Prov:RESHMA POOL PA-C         16


Reported Medications


Folic Acid* (Folic Acid*) 1 Mg Tablet, 1 MG PO DAILY, TAB


   14


Multivit/Min/Fol Ac/Iron/Pren* (Prenatal S*) 1 Tab Tab, 1 TAB PO DAILY, TAB


   14


Hydrochlorothiazide* (Hydrochlorothiazide*) 25 Mg Tab, 25 MG PO DAILY, TAB


   14





Allergies


Allergies:  


Coded Allergies:  


     No Known Allergies (Verified  Allergy, Mild, 19)





PMhx/Soc


History of Surgery:  Yes (c section x 2)


Anesthesia Reaction:  No


Hx Neurological Disorder:  No


Hx Respiratory Disorders:  No


Hx Cardiac Disorders:  No


Hx Psychiatric Problems:  No


Hx Miscellaneous Medical Probl:  No


Hx Alcohol Use:  No


Hx Substance Use:  No


Hx Tobacco Use:  No





FmHx


Family History:  No diabetes





Physical Exam


Vitals





Vital Signs


  Date      Temp  Pulse  Resp  B/P (MAP)   Pulse Ox  O2          O2 Flow    FiO2


Time                                                 Delivery    Rate


   19  98.4     87    18      154/83        97


     00:28                          (106)





Physical Exam


Const:   No acute distress


Head:   Atraumatic 


Eyes:    Normal Conjunctiva


ENT:    Normal External Ears, Nose and Mouth.


Neck:               Full range of motion. No meningismus.


Resp:   Clear to auscultation bilaterally


Cardio:   Regular rate and rhythm, no murmurs


Abd:    Soft, non tender, non distended. Normal bowel sounds.  No rebound 


tenderness or guarding.  No McBurney's point tenderness.


Skin:   No petechiae or rashes


Back:   No midline or flank tenderness


Ext:    No cyanosis, or edema


Neur:   Awake and alert


Psych:    Normal Mood and Affect


Result Diagram:  


19 0129                                                                    


           19 0129





Results 24 hrs





Laboratory Tests


     Test
                                  19
01:25    19
01:29


     Urine Color                          YELLOW


     Urine Clarity                        CLEAR


     Urine pH                                        6.0


     Urine Specific Gravity                        1.010


     Urine Ketones                        NEGATIVE mg/dL


     Urine Nitrite                        NEGATIVE mg/dL


     Urine Bilirubin                      NEGATIVE mg/dL


     Urine Urobilinogen                   NEGATIVE mg/dL


     Urine Leukocyte Esterase
            NEGATIVE
John/ul  



     Urine Microscopic RBC                       46 /HPF


     Urine Microscopic WBC                        2 /HPF


     Urine Mucus                          FEW /HPF


     Urine Hemoglobin                           3+ mg/dL


     Urine Glucose                        NEGATIVE mg/dL


     Urine Total Protein                  NEGATIVE mg/dl


     White Blood Count                                       10.7 10^3/ul


     Red Blood Count                                         4.26 10^6/ul


     Hemoglobin                                                 12.9 g/dl


     Hematocrit                                                    38.3 %


     Mean Corpuscular Volume                                      89.9 fl


     Mean Corpuscular Hemoglobin                                  30.3 pg


     Mean Corpuscular Hemoglobin
Concent  
                    33.7 g/dl 



     Red Cell Distribution Width                                   13.1 %


     Platelet Count                                           245 10^3/UL


     Mean Platelet Volume                                         10.5 fl


     Immature Granulocytes %                                      0.400 %


     Neutrophils %                                                 72.7 %


     Lymphocytes %                                                 19.8 %


     Monocytes %                                                    5.4 %


     Eosinophils %                                                  1.4 %


     Basophils %                                                    0.3 %


     Nucleated Red Blood Cells %                              0.0 /100WBC


     Immature Granulocytes #                                0.040 10^3/ul


     Neutrophils #                                            7.8 10^3/ul


     Lymphocytes #                                            2.1 10^3/ul


     Monocytes #                                              0.6 10^3/ul


     Eosinophils #                                            0.2 10^3/ul


     Basophils #                                              0.0 10^3/ul


     Nucleated Red Blood Cells #                              0.0 10^3/ul


     Sodium Level                                              139 mmol/L


     Potassium Level                                           4.5 mmol/L


     Chloride Level                                            107 mmol/L


     Carbon Dioxide Level                                       25 mmol/L


     Anion Gap                                                          7


     Blood Urea Nitrogen                                          9 mg/dl


     Creatinine                                                0.51 mg/dl


     Est Glomerular Filtrat Rate
mL/min   
                > 60 mL/min 



     Glucose Level                                              125 mg/dl


     Calcium Level                                              9.6 mg/dl


     Total Bilirubin                                            0.5 mg/dl


     Direct Bilirubin                                          0.00 mg/dl


     Indirect Bilirubin                                         0.5 mg/dl


     Aspartate Amino Transf
(AST/SGOT)    
                      15 IU/L 



     Alanine Aminotransferase
(ALT/SGPT)  
                      19 IU/L 



     Alkaline Phosphatase                                         82 IU/L


     Total Protein                                               7.3 g/dl


     Albumin                                                     3.8 g/dl


     Globulin                                                   3.50 g/dl


     Albumin/Globulin Ratio                                          1.08


     Beta HCG, Quantitative                                71871.0 mIU/ml





Current Medications


 Medications
   Dose
          Sig/Cody
       Start Time
   Status  Last


 (Trade)       Ordered        Route
 PRN     Stop Time              Admin
Dose


                              Reason                                Admin


                650 mg         ONCE  ONCE
    19       DC           19


Acetaminophen                 PO
            04:30
                       04:10




  (Tylenol                                  19 04:31


Tab)


[place] [address]


                        Radiology Main Line: [phone]





                            DIAGNOSTIC IMAGING REPORT





Patient: STACIA DUNLAP   : 1985   Age: 33  Sex: F                    


   


       MR #:    [id number]   Acct #:   [id number]    DOS: 19 0000


Ordering MD: ARIANNA QUINN PA-C   Location:  [facility]   Room/Bed:             


                              


                                        


PROCEDURE:   US OB. 


 


CLINICAL INDICATION:   Uncertain size and dates. Vaginal bleeding. 


 


TECHNIQUE:   Multiple sonographic images of the pregnant uterus were obtained.  


The images were reviewed on a PACS workstation. 


 


COMPARISON:   No prior studies are available for comparison. 


 


FINDINGS:


There is a single live intrauterine gestation.  Fetal heart rate is 174 beats 


per minute. 


Measurements were made in order to determine fetal age. The results are as 


follows:


BPD = 2.42 cm.


HC = 8.82 cm.


AC = 8.27 cm.


FL = 1.53 cm.


Estimated fetal weight is 99 +/- 15 grams.  


LMP growth percentile is 96 %.    Maximum vertical pocket of amniotic fluid is 


3.7 cm.


Menstrual age by ultrasound dates is 14 weeks 2 days.  


The estimated date of delivery is 2020.  


Position is cephalic and placenta is anterior grade 0. There is no evidence for 


an abruption or placenta previa.


 


IMPRESSION:


1.  Single live intrauterine gestation of 14 weeks 2 days gestational age by 


ultrasound dates.  


2.  The estimated date of delivery is 2020. 


 


RPTAT: QQ


_____________________________________________ 


.Garry Chand MD, MD           Date    Time 


Electronically viewed and signed by .Garry Chand MD, MD on 2019 02:14 


 


D:  2019 02:14  T:  2019 02:14


.R/





CC: ARIANNA QUINN PA-C





[phone]


.





                          [place] [address]


                        Radiology Main Line: [phone]





                            DIAGNOSTIC IMAGING REPORT





Patient: STACIA DUNLAP   : 1985   Age: 33  Sex: F                    


   


       MR #:    [id number]   Acct #:   [id number]    DOS: 19 0000


Ordering MD: ARIANNA QUINN PA-C   Location:  [facility]   Room/Bed:             


                              


                                        


PROCEDURE:   US OB limited


 


CLINICAL INDICATION:    vaginal bleeding 


 


TECHNIQUE:   Limited transabdominal scan


 


COMPARISON:   Study from earlier 


 


FINDINGS:


 


An extremely limited study was performed with only 2 actual images submitted for


review. 1 image shows M-mode ultrasound over the fetus with heart rate of 142 


beats per minute. The other imaged shows an anterior placenta and on identified 


portions of the fetus.


 


IMPRESSION:


 


Extremely limited exam showing cardiac activity and anterior placenta.


 


 


 RPTAT: HLBE


_____________________________________________ 


Physician Matteo           Date    Time 


Electronically viewed and signed by Diandra Franks Physician on 2019 


05:16 


 


D:  2019 05:16  T:  2019 05:16


LE/





CC: ARIANNA QUINN PA-C





[phone]











Procedures/MDM


33-year-old female presents to the emergency department complaining of sudden 


onset vaginal bleeding just prior to arrival.  Obstetrics ultrasound showed an 


intrauterine pregnancy which was live with positive fetal heart tones.  Patient 


continued to have vaginal bleeding in the department and she passed a large 


amount of clots and was consistently concerned about the pregnancy and requested


repeat ultrasound.  Due to possibility of passing the pregnancy in the 


department, I did order repeat ultrasound which again did show positive fetal 


heart tones.  Beta hCG was consistent with term of pregnancy.  Patient's 


symptoms stabilized within the department and she was stable for discharge and 


further follow-up with her OB/GYN physician within the next 24 to 48 hours.  She


should return here immediately for any new, worsening, or concerning symptoms.  


The patient was in agreement with the diagnosis, plan, need for follow-up, 


return precautions.  No evidence to suggest tubo-ovarian abscess, ectopic 


pregnancy, PID, or other emergencies.





Departure


Diagnosis:  


   Primary Impression:  


   Vaginal bleeding in pregnant patient at less than 20 weeks ges...


Condition:  Fair


Patient Instructions:  Bleeding During Early Pregnancy





Additional Instructions:  


SPECIALIST:  YOU HAVE A MEDICAL CONDITION WHICH REQUIRES YOU TO SEE A   


SPECIALIST WITHIN THE NEXT 1-2 DAYS. PLEASE FOLLOW UP WITH YOUR PRIMARY 


PHYSICIAN FOR REFFERAL.IF YOU DO NOT HAVE A PRIMARY CARE PHYSICIAN AND/OR YOU 


CAN NOT AFFORD TO SEE A PHYSICIAN THE FOLLOWING RESOURCES HAVE BEEN SUPPLIED TO 


YOU. IT IS YOUR RESPONSIBILITY TO BE SEEN BY THE SPECIALIST: ARIANNA SHELTON PA-C       2019 05:38